# Patient Record
Sex: MALE | Race: WHITE | NOT HISPANIC OR LATINO | Employment: FULL TIME | URBAN - METROPOLITAN AREA
[De-identification: names, ages, dates, MRNs, and addresses within clinical notes are randomized per-mention and may not be internally consistent; named-entity substitution may affect disease eponyms.]

---

## 2024-10-31 ENCOUNTER — APPOINTMENT (OUTPATIENT)
Age: 54
End: 2024-10-31
Payer: COMMERCIAL

## 2024-10-31 ENCOUNTER — OFFICE VISIT (OUTPATIENT)
Age: 54
End: 2024-10-31
Payer: COMMERCIAL

## 2024-10-31 VITALS
SYSTOLIC BLOOD PRESSURE: 132 MMHG | DIASTOLIC BLOOD PRESSURE: 78 MMHG | BODY MASS INDEX: 33.64 KG/M2 | HEIGHT: 70 IN | HEART RATE: 82 BPM | WEIGHT: 235 LBS

## 2024-10-31 DIAGNOSIS — M54.16 RADICULOPATHY, LUMBAR REGION: Primary | ICD-10-CM

## 2024-10-31 DIAGNOSIS — M25.551 PAIN IN RIGHT HIP: ICD-10-CM

## 2024-10-31 PROCEDURE — 99204 OFFICE O/P NEW MOD 45 MIN: CPT | Performed by: ORTHOPAEDIC SURGERY

## 2024-10-31 PROCEDURE — 73502 X-RAY EXAM HIP UNI 2-3 VIEWS: CPT

## 2024-10-31 RX ORDER — LEVOTHYROXINE SODIUM 125 UG/1
1 TABLET ORAL
COMMUNITY
Start: 2024-06-21

## 2024-10-31 RX ORDER — PEN NEEDLE, DIABETIC, SAFETY 30 GX3/16"
NEEDLE, DISPOSABLE MISCELLANEOUS
COMMUNITY
Start: 2024-08-21

## 2024-10-31 RX ORDER — CYCLOBENZAPRINE HCL 5 MG
TABLET ORAL
COMMUNITY
Start: 2024-09-06

## 2024-10-31 RX ORDER — LISINOPRIL 20 MG/1
20 TABLET ORAL DAILY
COMMUNITY

## 2024-10-31 RX ORDER — INSULIN GLARGINE-YFGN 100 [IU]/ML
INJECTION, SOLUTION SUBCUTANEOUS
COMMUNITY

## 2024-10-31 RX ORDER — CHLORTHALIDONE 25 MG/1
1 TABLET ORAL EVERY MORNING
COMMUNITY
Start: 2024-06-21

## 2024-10-31 NOTE — PROGRESS NOTES
CHIEF COMPLAINT/REASON FOR VISIT  Chief Complaint   Patient presents with    Right Hip - Pain        HISTORY OF PRESENT ILLNESS  Miguel Kc is a 54 y.o. male who presents for evaluation of his right  hip . Patient said for the past 2.5 years he has been dealing lower back pain which radiates down into the foot. Patient said he started seeing a chiropractor which helped with the lower back pain but not with the pain that radiates down to the foot.     REVIEW OF SYSTEMS  Review of systems was performed and, outside that mentioned in the HPI, it was negative for symptomology related to the integumentary, hematologic, immunologic, allergic, neurologic, cardiovascular, respiratory, GI or  systems.    MEDICAL HISTORY  There is no problem list on file for this patient.      SURGICAL HISTORY  History reviewed. No pertinent surgical history.    CURRENT MEDICATIONS    Current Outpatient Medications:     BD AutoShield Duo 30G X 5 MM MISC, 1 STICK BY MISCELLANEOUS ROUTE NIGHTLY., Disp: , Rfl:     chlorthalidone 25 mg tablet, Take 1 tablet by mouth every morning, Disp: , Rfl:     cyclobenzaprine (FLEXERIL) 5 mg tablet, take 1 tablet by mouth nightly as needed, Disp: , Rfl:     levothyroxine 125 mcg tablet, Take 1 tablet by mouth daily before breakfast, Disp: , Rfl:     lisinopril (ZESTRIL) 20 mg tablet, Take 20 mg by mouth daily, Disp: , Rfl:     metFORMIN (GLUCOPHAGE) 500 mg tablet, Take 1 tablet by mouth 2 (two) times a day with meals, Disp: , Rfl:     omeprazole (PriLOSEC) 20 mg delayed release capsule, Take 1 capsule by mouth in the morning, Disp: , Rfl:     Semglee, yfgn, 100 UNIT/ML SOPN, INJECT 15 UNITS INTO THE SKIN NIGHTLY, Disp: , Rfl:     SOCIAL HISTORY  Social History     Socioeconomic History    Marital status: /Civil Union     Spouse name: Not on file    Number of children: Not on file    Years of education: Not on file    Highest education level: Not on file   Occupational History    Not on file  "  Tobacco Use    Smoking status: Former     Types: Cigarettes     Start date: 3/20/2019     Passive exposure: Past    Smokeless tobacco: Former   Vaping Use    Vaping status: Never Used   Substance and Sexual Activity    Alcohol use: Yes    Drug use: Never    Sexual activity: Not on file   Other Topics Concern    Not on file   Social History Narrative    Not on file     Social Determinants of Health     Financial Resource Strain: Not on file   Food Insecurity: Not on file   Transportation Needs: Not on file   Physical Activity: Not on file   Stress: Not on file   Social Connections: Unknown (6/18/2024)    Received from [facility]     How often do you feel lonely or isolated from those around you? (Adult - for ages 18 years and over): Not on file   Intimate Partner Violence: Not on file   Housing Stability: Not on file       Objective     VITAL SIGNS  /78   Pulse 82   Ht 5' 10\" (1.778 m)   Wt 107 kg (235 lb)   BMI 33.72 kg/m²        PHYSICAL EXAMINATION    right hip:   Skin- no discoloration, wounds or gross deformity   No limb length discrepancy  Limp positioned normally.  No dislocation/deformity  Gait: normal  Tenderness to palpation: None  ROM: Decreased internal rotation  Alda test: negative  Positive SLR  AT/GS intact  Full strength and sensory intact to light touch throughout extremity       RADIOGRAPHIC EXAMINATION/DIAGNOSTICS:  Right hip x-rays show no obvious acute osseous abnormalities    ASSESSMENT/PLAN:  Lumbar radiculopathy    Given patient history and exam findings, he is likely dealing with pain stemming from the lumbar spine.  Referral placed to spine and pain management for further workup  We did discuss that his x-ray does not appear to show any arthritis at this time and it is unlikely that his pain is coming from the hip  We will see the patient on an as-needed basis  Patient is understanding and agreeable to plan.  He is to call with any other questions or " concerns.    Scribe Attestation      I,:  Amor Edmondson PA-C am acting as a scribe while in the presence of the attending physician.:       I,:  Raimundo Weinstein MD personally performed the services described in this documentation    as scribed in my presence.:

## 2024-12-16 ENCOUNTER — CONSULT (OUTPATIENT)
Dept: PAIN MEDICINE | Facility: MEDICAL CENTER | Age: 54
End: 2024-12-16
Payer: COMMERCIAL

## 2024-12-16 VITALS
HEIGHT: 70 IN | SYSTOLIC BLOOD PRESSURE: 135 MMHG | BODY MASS INDEX: 35.07 KG/M2 | DIASTOLIC BLOOD PRESSURE: 88 MMHG | WEIGHT: 245 LBS | HEART RATE: 65 BPM

## 2024-12-16 DIAGNOSIS — M54.16 RIGHT LUMBAR RADICULITIS: ICD-10-CM

## 2024-12-16 PROCEDURE — 99244 OFF/OP CNSLTJ NEW/EST MOD 40: CPT | Performed by: PHYSICAL MEDICINE & REHABILITATION

## 2024-12-16 RX ORDER — ATORVASTATIN CALCIUM 20 MG/1
1 TABLET, FILM COATED ORAL
COMMUNITY
Start: 2024-10-16

## 2024-12-16 NOTE — PROGRESS NOTES
Assessment  1. Right lumbar radiculitis        Plan  At this time an MRI of the lumbar spine is warranted as the patient continues with significant pain despite adequate conservative care trials including at home physical therapy and more recently chiropractic care.  Chiropractic did provide some relief of his back pain but he continues with radicular symptoms in a right L5 distribution.  Once the MRI is available for review we will determine if further treatment is indicated such as epidural steroid injections.    My impressions and treatment recommendations were discussed in detail with the patient who verbalized understanding and had no further questions.  Discharge instructions were provided. I personally saw and examined the patient and I agree with the above discussed plan of care.    No orders of the defined types were placed in this encounter.    New Medications Ordered This Visit   Medications    atorvastatin (LIPITOR) 20 mg tablet     Sig: Take 1 tablet by mouth daily at bedtime       History of Present Illness    Miguel Kc is a 54 y.o. male seen in consultation at the request of Dr. Weinstein regarding chronic back and radiating right leg pain.  The patient's been experiencing symptoms for at least 2-1/2 years without any inciting event or trauma.  He is describing moderate to severe intensity pain rated as a 7-8/10 which is nearly constant without any typical pattern characterized as burning numbness sharp and throbbing.  He does notice some lower extremity weakness when the pain is bad and has had his leg give way but no falls.    Currently not using any assistive devices.    Aggravating factors include walking coughing and sneezing.  Alleviating factors include sitting.    Diagnostic studies include x-rays of the lumbar spine which do not reveal any significant concerning findings.    Patient did notice some moderate relief with chiropractic care.  He has been visiting with a chiropractor over the  past year and discontinued about a month ago.    No relief from exercise heat or ice application or TENS unit.    Social history negative for tobacco marijuana and alcohol use.    Currently not using any prescription pain medications.    I have personally reviewed and/or updated the patient's past medical history, past surgical history, family history, social history, current medications, allergies, and vital signs today.     Review of Systems   Constitutional:  Negative for fever and unexpected weight change.   HENT:  Negative for trouble swallowing.    Eyes:  Negative for visual disturbance.   Respiratory:  Negative for shortness of breath and wheezing.    Cardiovascular:  Negative for chest pain and palpitations.   Gastrointestinal:  Negative for constipation, diarrhea, nausea and vomiting.   Endocrine: Negative for cold intolerance, heat intolerance and polydipsia.   Genitourinary:  Negative for difficulty urinating and frequency.   Musculoskeletal:  Positive for back pain, gait problem and myalgias. Negative for arthralgias and joint swelling.   Skin:  Negative for rash.   Neurological:  Negative for dizziness, seizures, syncope, weakness and headaches.   Hematological:  Does not bruise/bleed easily.   Psychiatric/Behavioral:  Negative for dysphoric mood.    All other systems reviewed and are negative.      There is no problem list on file for this patient.      Past Medical History:   Diagnosis Date    Bipolar disorder (HCC) 2010    Depression 2010    Diabetes mellitus (HCC) 2018    GERD (gastroesophageal reflux disease) 2024    Hypertension 2013    Peripheral neuropathy        Past Surgical History:   Procedure Laterality Date    ORTHOPEDIC SURGERY  2011       Family History   Problem Relation Age of Onset    Arthritis Mother     Diabetes Father     Cancer Maternal Grandmother     Diabetes Paternal Grandmother     Stroke Paternal Grandmother     Depression Brother     Diabetes Brother     Seizures Daughter   "      Social History     Occupational History    Not on file   Tobacco Use    Smoking status: Former     Average packs/day: 1.5 packs/day for 25.0 years (37.5 ttl pk-yrs)     Types: Cigarettes     Start date: 7/14/1982     Passive exposure: Past    Smokeless tobacco: Former     Types: Chew     Quit date: 7/12/1982   Vaping Use    Vaping status: Never Used   Substance and Sexual Activity    Alcohol use: Yes     Alcohol/week: 2.0 standard drinks of alcohol     Types: 2 Cans of beer per week     Comment: Not that often    Drug use: Not Currently     Frequency: 2.0 times per week     Types: Marijuana     Comment: Haven't smoked since 1998    Sexual activity: Not Currently     Partners: Female     Birth control/protection: None     Comment: With wife only       Current Outpatient Medications on File Prior to Visit   Medication Sig    atorvastatin (LIPITOR) 20 mg tablet Take 1 tablet by mouth daily at bedtime    chlorthalidone 25 mg tablet Take 1 tablet by mouth every morning    cyclobenzaprine (FLEXERIL) 5 mg tablet take 1 tablet by mouth nightly as needed    levothyroxine 125 mcg tablet Take 1 tablet by mouth daily before breakfast    lisinopril (ZESTRIL) 20 mg tablet Take 20 mg by mouth daily    metFORMIN (GLUCOPHAGE) 500 mg tablet Take 1 tablet by mouth 2 (two) times a day with meals    omeprazole (PriLOSEC) 20 mg delayed release capsule Take 1 capsule by mouth in the morning    Edilberto, carminegn, 100 UNIT/ML SOPN INJECT 15 UNITS INTO THE SKIN NIGHTLY    BD AutoShield Duo 30G X 5 MM MISC 1 STICK BY MISCELLANEOUS ROUTE NIGHTLY. (Patient not taking: Reported on 12/16/2024)     No current facility-administered medications on file prior to visit.       Allergies   Allergen Reactions    Bee Venom Anaphylaxis    Latex Rash       Physical Exam    /88   Pulse 65   Ht 5' 10\" (1.778 m)   Wt 111 kg (245 lb)   BMI 35.15 kg/m²     Constitutional: normal, well developed, well nourished, alert, in no distress and non-toxic and " no overt pain behavior.  Eyes: anicteric  HEENT: grossly intact  Neck: supple, symmetric, trachea midline and no masses   Pulmonary:even and unlabored  Cardiovascular:No edema or pitting edema present  Skin:Normal without rashes or lesions and well hydrated  Psychiatric:Mood and affect appropriate  Neurologic:Cranial Nerves II-XII grossly intact, bilateral lower extremity muscle stretch reflexes are physiologic and symmetric at the knees and ankles, bilateral lower extremity strength is normal, negative straight leg raise from a seated position  Musculoskeletal:normal    Imaging    XR spine lumbar minimum 4 views non injury  Order: [id number]  Impression    Mild multilevel degenerative change, most significant at L2-3 and L3-4.    [facility]    Dictation By:   Nestor Macdonald MD   This report has been electronically signed by:   Nestor Macdonald MD   3/4/2024 11:53 AM  Narrative    CLINICAL HISTORY: Chronic left-sided low back pain with left-sided sciatica.    COMPARISON: None    TECHNIQUE: 5 views of the lumbar spine.    FINDINGS:  There are 5 nonrib-bearing lumbar type vertebra. The vertebral body heights and alignment are maintained without evidence of acute fracture or significant spondylolisthesis. The intervertebral disc space heights are maintained. There are minimal marginal osteophytes, most significant at L2-3 and L3-4. Sacroiliac joints are maintained. The bowel gas pattern is unobstructed.  Exam End: 03/04/24  9:17 AM    Specimen Collected: 03/04/24 11:52 AM Last Resulted: 03/04/24 11:53 AM   Received From: [facility]  Result Received: 10/31/24 10:20 AM

## 2025-01-02 ENCOUNTER — TELEPHONE (OUTPATIENT)
Age: 55
End: 2025-01-02

## 2025-01-02 NOTE — TELEPHONE ENCOUNTER
REENAI    Caller: venkat Rivera    Doctor: David    Reason for call: pt called to be sure that we received his OV notes from his chiropractor.  Notes are available for the dr to review under the media tab dated 12/31/24    Call back#: [phone]

## 2025-01-02 NOTE — TELEPHONE ENCOUNTER
S/w pt and advised we received his records which ar ein media and his MRI was authorized per approval noted on appt desk. Pt verbalized understanding.

## 2025-01-17 ENCOUNTER — HOSPITAL ENCOUNTER (OUTPATIENT)
Dept: MRI IMAGING | Facility: HOSPITAL | Age: 55
Discharge: HOME | End: 2025-01-17
Attending: PHYSICAL MEDICINE & REHABILITATION
Payer: COMMERCIAL

## 2025-01-17 DIAGNOSIS — M54.16 RIGHT LUMBAR RADICULITIS: ICD-10-CM

## 2025-01-17 PROCEDURE — 72148 MRI LUMBAR SPINE W/O DYE: CPT

## 2025-01-24 ENCOUNTER — RESULTS FOLLOW-UP (OUTPATIENT)
Dept: RADIOLOGY | Facility: MEDICAL CENTER | Age: 55
End: 2025-01-24

## 2025-01-24 NOTE — TELEPHONE ENCOUNTER
S/w pt, advised of above. Pt verbalized agreement, denied blood thinning medication, confirmed dm. Pt stated that he is due for A1c but will not likely get that done until Feb. Pt's last A1c was 6.3 on 7/30/24. Advised pt, the writer will make JE aware however, his next A1c may be necessary before his procedure can be scheduled. Pt verbalized understanding and appreciation. Will cb if questions / issues arise.

## 2025-01-24 NOTE — TELEPHONE ENCOUNTER
----- Message from Sergio Hernandez DO sent at 1/24/2025  2:28 PM EST -----  L4-5: Mild diffuse disc bulge. Mild spinal canal and subarticular narrowing. Mild bilateral neural foraminal stenosis. Bilateral facet arthropathy and fluid within the facet joints with edema in the articular pillars and masses extending to the bilateral L4 and L5 pedicles.    RECOMMEND RIGHT L4-5 TFESI

## 2025-01-27 NOTE — RESULT ENCOUNTER NOTE
Procedure scheduled 2/14/25    Reviewed instructions: , NPO 1 hour prior, loose-fitting/comfortable pants, if ill/fever/infx/abx to call and reschedule.  No immunizations or vaccinations 2 days prior/after steroid injections. Patient stated verbal understanding.

## 2025-02-14 ENCOUNTER — HOSPITAL ENCOUNTER (OUTPATIENT)
Dept: RADIOLOGY | Facility: MEDICAL CENTER | Age: 55
End: 2025-02-14
Payer: COMMERCIAL

## 2025-02-14 VITALS
TEMPERATURE: 98.4 F | SYSTOLIC BLOOD PRESSURE: 131 MMHG | RESPIRATION RATE: 20 BRPM | OXYGEN SATURATION: 97 % | DIASTOLIC BLOOD PRESSURE: 89 MMHG | HEART RATE: 73 BPM

## 2025-02-14 DIAGNOSIS — M54.16 LUMBAR RADICULOPATHY: ICD-10-CM

## 2025-02-14 PROCEDURE — 64483 NJX AA&/STRD TFRM EPI L/S 1: CPT | Performed by: PHYSICAL MEDICINE & REHABILITATION

## 2025-02-14 RX ORDER — PAPAVERINE HCL 150 MG
10 CAPSULE, EXTENDED RELEASE ORAL ONCE
Status: COMPLETED | OUTPATIENT
Start: 2025-02-14 | End: 2025-02-14

## 2025-02-14 RX ADMIN — IOHEXOL 2 ML: 300 INJECTION, SOLUTION INTRAVENOUS at 13:32

## 2025-02-14 RX ADMIN — DEXAMETHASONE SODIUM PHOSPHATE 10 MG: 10 INJECTION INTRAMUSCULAR; INTRAVENOUS at 13:32

## 2025-02-14 NOTE — H&P
History of Present Illness: The patient is a 54 y.o. male who presents with complaints of right back and leg pain    Past Medical History:   Diagnosis Date    Bipolar disorder (HCC) 2010    Depression 2010    Diabetes mellitus (HCC) 2018    GERD (gastroesophageal reflux disease) 2024    Hypertension 2013    Peripheral neuropathy        Past Surgical History:   Procedure Laterality Date    ORTHOPEDIC SURGERY  2011         Current Outpatient Medications:     atorvastatin (LIPITOR) 20 mg tablet, Take 1 tablet by mouth daily at bedtime, Disp: , Rfl:     BD AutoShield Duo 30G X 5 MM MISC, 1 STICK BY MISCELLANEOUS ROUTE NIGHTLY. (Patient not taking: Reported on 12/16/2024), Disp: , Rfl:     chlorthalidone 25 mg tablet, Take 1 tablet by mouth every morning, Disp: , Rfl:     cyclobenzaprine (FLEXERIL) 5 mg tablet, take 1 tablet by mouth nightly as needed, Disp: , Rfl:     levothyroxine 125 mcg tablet, Take 1 tablet by mouth daily before breakfast, Disp: , Rfl:     lisinopril (ZESTRIL) 20 mg tablet, Take 20 mg by mouth daily, Disp: , Rfl:     metFORMIN (GLUCOPHAGE) 500 mg tablet, Take 1 tablet by mouth 2 (two) times a day with meals, Disp: , Rfl:     omeprazole (PriLOSEC) 20 mg delayed release capsule, Take 1 capsule by mouth in the morning, Disp: , Rfl:     Semglee, yfgn, 100 UNIT/ML SOPN, INJECT 15 UNITS INTO THE SKIN NIGHTLY, Disp: , Rfl:     Allergies   Allergen Reactions    Bee Venom Anaphylaxis    Latex Rash       Physical Exam:   Vitals:    02/14/25 1313   BP: 132/88   Pulse: 69   Resp: 20   Temp: 98.4 °F (36.9 °C)   SpO2: 95%     General: Awake, Alert, Oriented x 3, Mood and affect appropriate  Respiratory: Respirations even and unlabored  Cardiovascular: Peripheral pulses intact; no edema  Musculoskeletal Exam: right back and leg pain    ASA Score: 3    Patient/Chart Verification  Patient ID Verified: Verbal  ID Band Applied: No  Consents Confirmed: To be obtained in the Procedural area  H&P( within 30 days)  Verified: To be obtained in the Procedural area  Allergies Reviewed: Yes (latex)  Anticoag/NSAID held?: NA  Currently on antibiotics?: No    Assessment:   1. Lumbar radiculopathy        Plan: RIGHT L4-5 TFESI ([id number])

## 2025-02-14 NOTE — DISCHARGE INSTR - LAB
Epidural Steroid Injection   WHAT YOU NEED TO KNOW:   An epidural steroid injection (CHRISTIAN) is a procedure to inject steroid medicine into the epidural space. The epidural space is between your spinal cord and vertebrae. Steroids reduce inflammation and fluid buildup in your spine that may be causing pain. You may be given pain medicine along with the steroids.          ACTIVITY  Do not drive or operate machinery today.  No strenuous activity today - bending, lifting, etc.  You may resume normal activites starting tomorrow - start slowly and as tolerated.  You may shower today, but no tub baths or hot tubs.  You may have numbness for several hours from the local anesthetic. Please use caution and common sense, especially with weight-bearing activities.    CARE OF THE INJECTION SITE  If you have soreness or pain, apply ice to the area today (20 minutes on/20 minutes off).  Starting tomorrow, you may use warm, moist heat or ice if needed.  You may have an increase or change in your discomfort for 36-48 hours after your treatment.  Apply ice and continue with any pain medication you have been prescribed.  Notify the [facility] if you have any of the following: redness, drainage, swelling, headache, stiff neck or fever above 100°F.    SPECIAL INSTRUCTIONS  Our office will contact you in approximately 14 days for a progress report.    MEDICATIONS  Continue to take all routine medications.  Our office may have instructed you to hold some medications.    As no general anesthesia was used in today's procedure, you should not experience any side effects related to anesthesia.     If you are diabetic, the steroids used in today's injection may temporarily increase your blood sugar levels after the first few days after your injection. Please keep a close eye on your sugars and alert the doctor who manages your diabetes if your sugars are significantly high from your baseline or you are symptomatic.     If you have a  problem specifically related to your procedure, please call our office at [phone].  Problems not related to your procedure should be directed to your primary care physician.

## 2025-02-28 ENCOUNTER — TELEPHONE (OUTPATIENT)
Dept: PAIN MEDICINE | Facility: CLINIC | Age: 55
End: 2025-02-28

## 2025-02-28 NOTE — TELEPHONE ENCOUNTER
Patient reports 50% improvement post inj  Pain level 6/10    Pain feels off and on, more off than on.

## 2025-03-18 ENCOUNTER — OFFICE VISIT (OUTPATIENT)
Dept: PAIN MEDICINE | Facility: MEDICAL CENTER | Age: 55
End: 2025-03-18
Payer: COMMERCIAL

## 2025-03-18 VITALS — HEIGHT: 70 IN | WEIGHT: 245 LBS | BODY MASS INDEX: 35.07 KG/M2

## 2025-03-18 DIAGNOSIS — M54.16 LUMBAR RADICULITIS: Primary | ICD-10-CM

## 2025-03-18 PROCEDURE — 99214 OFFICE O/P EST MOD 30 MIN: CPT

## 2025-03-18 RX ORDER — GABAPENTIN 300 MG/1
300 CAPSULE ORAL 3 TIMES DAILY
Qty: 90 CAPSULE | Refills: 1 | Status: SHIPPED | OUTPATIENT
Start: 2025-03-18

## 2025-03-18 NOTE — PROGRESS NOTES
Assessment:  1. Lumbar radiculitis        Plan:  Start gabapentin 300 mg as prescribed.  I discussed with the patient that I felt a medication such as gabapentin would be helpful in treating their pain. I discussed with the patient the type of medication it is, how it works, and that it requires a titration process that is specific to each individual. I reviewed with the patient that is may take 3-4 weeks for the medication's effects to be noticed and that is should never be abruptly stopped. Possible side effects include but are not limited to; vertigo, lethargy, nausea, and edema of the extremities. Advised the patient to call our office if they experience any side effects. The patient verbalized an understanding.    May consider LESI at next visit if symptoms are persisting/worsening.    Follow-up in approximately 4 weeks for medication review or sooner if needed.    My impressions and treatment recommendations were discussed in detail with the patient who verbalized understanding and had no further questions.  Discharge instructions were provided. I personally saw and examined the patient and I agree with the above discussed plan of care.    No orders of the defined types were placed in this encounter.    New Medications Ordered This Visit   Medications    INSULIN ASPART PENFILL SC    Atorvastatin Calcium 20 MG/5ML SUSP    gabapentin (Neurontin) 300 mg capsule     Sig: Take 1 capsule (300 mg total) by mouth 3 (three) times a day Take 1 capsule nightly x 3 days.  Then take 1 capsule twice daily x 3 days.  Then take 1 capsule 3 times daily.     Dispense:  90 capsule     Refill:  1       History of Present Illness:  Miguel Kc is a 54 y.o. male who presents for a follow up office visit in regards to low back pain and recent injection.  On 2/14/2024 patient had right L4-5 TFESI completed.  Patient noted 25 to 50% pain relief lasting approximately 2 weeks following this injection.      Patient continues to note  ongoing right sided low back pain with intermittent radiating pain down the right leg. Patient describes his pain as an intermittent burning, sharp, and cramping pain.  He rates his pain 7/10 on a numeric rating scale.  Admits intermittent numbness along the lateral right thigh, lateral right calf, and lateral right foot.  He notes his pain is exacerbated with walking and prolonged standing.  Admits to the use of ibuprofen for symptom management.    I have personally reviewed and/or updated the patient's past medical history, past surgical history, family history, social history, current medications, allergies, and vital signs today.     Review of Systems   Respiratory:  Negative for shortness of breath.    Cardiovascular:  Negative for chest pain.   Gastrointestinal:  Negative for constipation, diarrhea, nausea and vomiting.   Musculoskeletal:  Positive for back pain, gait problem and myalgias. Negative for arthralgias and joint swelling.   Skin:  Negative for rash.   Neurological:  Negative for dizziness, seizures and weakness.   All other systems reviewed and are negative.      Patient Active Problem List   Diagnosis    Lumbar radiculopathy       Past Medical History:   Diagnosis Date    Bipolar disorder ([facility]) 2010    Depression 2010    Diabetes mellitus ([facility]) 2018    GERD (gastroesophageal reflux disease) 2024    Hypertension 2013    Peripheral neuropathy        Past Surgical History:   Procedure Laterality Date    ORTHOPEDIC SURGERY  2011       Family History   Problem Relation Age of Onset    Arthritis Mother     Diabetes Father     Cancer Maternal Grandmother     Diabetes Paternal Grandmother     Stroke Paternal Grandmother     Depression Brother     Diabetes Brother     Seizures Daughter        Social History     Occupational History    Not on file   Tobacco Use    Smoking status: Former     Average packs/day: 1.5 packs/day for 25.0 years (37.5 ttl pk-yrs)     Types: Cigarettes     Start date: 7/14/1982      "Passive exposure: Past    Smokeless tobacco: Former     Types: Chew     Quit date: 7/12/1982   Vaping Use    Vaping status: Never Used   Substance and Sexual Activity    Alcohol use: Yes     Alcohol/week: 2.0 standard drinks of alcohol     Types: 2 Cans of beer per week     Comment: Not that often    Drug use: Not Currently     Frequency: 2.0 times per week     Types: Marijuana     Comment: Haven't smoked since 1998    Sexual activity: Not Currently     Partners: Female     Birth control/protection: None     Comment: With wife only       Current Outpatient Medications on File Prior to Visit   Medication Sig    atorvastatin (LIPITOR) 20 mg tablet Take 1 tablet by mouth daily at bedtime    Atorvastatin Calcium 20 MG/5ML SUSP     chlorthalidone 25 mg tablet Take 1 tablet by mouth every morning    INSULIN ASPART PENFILL SC     levothyroxine 125 mcg tablet Take 1 tablet by mouth daily before breakfast    lisinopril (ZESTRIL) 20 mg tablet Take 20 mg by mouth daily    metFORMIN (GLUCOPHAGE) 500 mg tablet Take 1 tablet by mouth 2 (two) times a day with meals    omeprazole (PriLOSEC) 20 mg delayed release capsule Take 1 capsule by mouth in the morning    Semglee, yfgn, 100 UNIT/ML SOPN INJECT 15 UNITS INTO THE SKIN NIGHTLY    BD AutoShield Duo 30G X 5 MM MISC 1 STICK BY MISCELLANEOUS ROUTE NIGHTLY. (Patient not taking: Reported on 12/16/2024)    cyclobenzaprine (FLEXERIL) 5 mg tablet take 1 tablet by mouth nightly as needed     No current facility-administered medications on file prior to visit.       Allergies   Allergen Reactions    Bee Venom Anaphylaxis    Latex Rash       Physical Exam:    Ht 5' 10\" (1.778 m)   Wt 111 kg (245 lb)   BMI 35.15 kg/m²     Constitutional:normal, well developed, well nourished, alert, in no distress and non-toxic and no overt pain behavior.  Eyes:anicteric  HEENT:grossly intact  Neck:supple, symmetric, trachea midline and no masses   Pulmonary:even and unlabored  Cardiovascular:No edema or " pitting edema present  Skin:Normal without rashes or lesions and well hydrated  Psychiatric:Mood and affect appropriate  Neurologic:Cranial Nerves II-XII grossly intact, negative straight leg raise bilaterally, bilateral lower extremity strength normal  Musculoskeletal:normal    Imaging

## 2025-04-18 ENCOUNTER — OFFICE VISIT (OUTPATIENT)
Dept: PAIN MEDICINE | Facility: MEDICAL CENTER | Age: 55
End: 2025-04-18
Payer: COMMERCIAL

## 2025-04-18 VITALS — WEIGHT: 244.2 LBS | BODY MASS INDEX: 34.96 KG/M2 | HEIGHT: 70 IN

## 2025-04-18 DIAGNOSIS — M54.16 LUMBAR RADICULITIS: Primary | ICD-10-CM

## 2025-04-18 PROCEDURE — 99213 OFFICE O/P EST LOW 20 MIN: CPT

## 2025-04-18 NOTE — PROGRESS NOTES
Assessment:  1. Lumbar radiculitis        Plan:  Continue gabapentin as prescribed.  Patient reports significant symptom relief after recently beginning gabapentin 300 mg 3 times daily.  Patient states he tolerates this medications well without side effects.  No refills of this medication were needed to be sent into patient's pharmacy at today's visit.    Unfortunately, patient previously had a right L4-5 TFESI which provided only 25-50% relief which lasted only 2 weeks.  May consider L5-S1 LESI at next visit if patient's symptoms persist/worsen.    Follow-up in 3 months, or sooner if needed.    My impressions and treatment recommendations were discussed in detail with the patient who verbalized understanding and had no further questions.  Discharge instructions were provided. I personally saw and examined the patient and I agree with the above discussed plan of care.    No orders of the defined types were placed in this encounter.    No orders of the defined types were placed in this encounter.      History of Present Illness:  Miguel Kc is a 55 y.o. male who presents for a follow up office visit in regards to his chronic low back pain.  At last visit, patient was started on gabapentin 300 mg 3 times daily.  Patient notes significant symptom improvement after beginning this medication.  He notes he tolerates this medication well without side effects.  He does note some occasional right-sided low back pain.  He describes this as a cramping and shooting pain.  He rates his pain 4/10 on a numeric rating scale.  Patient does note some intermittent radiating pain, numbness, and tingling along the lateral right thigh.    I have personally reviewed and/or updated the patient's past medical history, past surgical history, family history, social history, current medications, allergies, and vital signs today.     Review of Systems    Patient Active Problem List   Diagnosis    Lumbar radiculopathy       Past Medical  History:   Diagnosis Date    Bipolar disorder ([facility]) 2010    Depression 2010    Diabetes mellitus ([facility]) 2018    GERD (gastroesophageal reflux disease) 2024    Hypertension 2013    Peripheral neuropathy        Past Surgical History:   Procedure Laterality Date    ORTHOPEDIC SURGERY  2011       Family History   Problem Relation Age of Onset    Arthritis Mother     Diabetes Father     Cancer Maternal Grandmother     Diabetes Paternal Grandmother     Stroke Paternal Grandmother     Depression Brother     Diabetes Brother     Seizures Daughter        Social History     Occupational History    Not on file   Tobacco Use    Smoking status: Former     Average packs/day: 1.5 packs/day for 25.0 years (37.5 ttl pk-yrs)     Types: Cigarettes     Start date: 7/14/1982     Passive exposure: Past    Smokeless tobacco: Former     Types: Chew     Quit date: 7/12/1982   Vaping Use    Vaping status: Never Used   Substance and Sexual Activity    Alcohol use: Yes     Alcohol/week: 2.0 standard drinks of alcohol     Types: 2 Cans of beer per week     Comment: Not that often    Drug use: Not Currently     Frequency: 2.0 times per week     Types: Marijuana     Comment: Haven't smoked since 1998    Sexual activity: Not Currently     Partners: Female     Birth control/protection: None     Comment: With wife only       Current Outpatient Medications on File Prior to Visit   Medication Sig    atorvastatin (LIPITOR) 20 mg tablet Take 1 tablet by mouth daily at bedtime    Atorvastatin Calcium 20 MG/5ML SUSP     chlorthalidone 25 mg tablet Take 1 tablet by mouth every morning    gabapentin (Neurontin) 300 mg capsule Take 1 capsule (300 mg total) by mouth 3 (three) times a day Take 1 capsule nightly x 3 days.  Then take 1 capsule twice daily x 3 days.  Then take 1 capsule 3 times daily.    INSULIN ASPART PENFILL SC     levothyroxine 125 mcg tablet Take 1 tablet by mouth daily before breakfast    lisinopril (ZESTRIL) 20 mg tablet Take 20 mg by mouth  "daily    metFORMIN (GLUCOPHAGE) 500 mg tablet Take 1 tablet by mouth 2 (two) times a day with meals    omeprazole (PriLOSEC) 20 mg delayed release capsule Take 1 capsule by mouth in the morning    Semglee, yfgn, 100 UNIT/ML SOPN INJECT 15 UNITS INTO THE SKIN NIGHTLY    BD AutoShield Duo 30G X 5 MM MISC 1 STICK BY MISCELLANEOUS ROUTE NIGHTLY. (Patient not taking: Reported on 12/16/2024)    cyclobenzaprine (FLEXERIL) 5 mg tablet take 1 tablet by mouth nightly as needed     No current facility-administered medications on file prior to visit.       Allergies   Allergen Reactions    Bee Venom Anaphylaxis    Latex Rash       Physical Exam:    Ht 5' 10\" (1.778 m)   Wt 111 kg (244 lb 3.2 oz)   BMI 35.04 kg/m²     Constitutional:normal, well developed, well nourished, alert, in no distress and non-toxic and no overt pain behavior.  Eyes:anicteric  HEENT:grossly intact  Neck:supple, symmetric, trachea midline and no masses   Pulmonary:even and unlabored  Cardiovascular:No edema or pitting edema present  Skin:Normal without rashes or lesions and well hydrated  Psychiatric:Mood and affect appropriate  Neurologic:Cranial Nerves II-XII grossly intact  Musculoskeletal:normal    Imaging    "

## 2025-05-26 DIAGNOSIS — M54.16 LUMBAR RADICULITIS: ICD-10-CM

## 2025-05-28 NOTE — TELEPHONE ENCOUNTER
Patient called to request a refill for their gabapentin  advised a refill was requested on 5/26 and is pending approval. Patient verbalized understanding and is in agreement.     Does the patient have enough for 3 days?   [x] Yes   [] No - Send as HP to POD

## 2025-05-29 DIAGNOSIS — M54.16 LUMBAR RADICULITIS: Primary | ICD-10-CM

## 2025-05-29 RX ORDER — GABAPENTIN 300 MG/1
300 CAPSULE ORAL 3 TIMES DAILY
Qty: 90 CAPSULE | Refills: 2 | Status: SHIPPED | OUTPATIENT
Start: 2025-05-29

## 2025-05-29 RX ORDER — GABAPENTIN 300 MG/1
300 CAPSULE ORAL 3 TIMES DAILY
Qty: 90 CAPSULE | Refills: 1 | OUTPATIENT
Start: 2025-05-29

## 2025-05-29 NOTE — TELEPHONE ENCOUNTER
S/W pt.  Pt stated he needs a refill.  He is taking it 300 mg TID.  Pt denies side effects and it is helping. Pt stated C/B not needed once sent in.  Please advise.

## 2025-06-09 ENCOUNTER — OFFICE VISIT (OUTPATIENT)
Dept: PAIN MEDICINE | Facility: MEDICAL CENTER | Age: 55
End: 2025-06-09
Payer: COMMERCIAL

## 2025-06-09 VITALS — WEIGHT: 248 LBS | HEIGHT: 70 IN | BODY MASS INDEX: 35.5 KG/M2

## 2025-06-09 DIAGNOSIS — M54.16 LUMBAR RADICULITIS: Primary | ICD-10-CM

## 2025-06-09 PROCEDURE — 99214 OFFICE O/P EST MOD 30 MIN: CPT

## 2025-06-09 NOTE — PROGRESS NOTES
Name: Miguel Kc      : 1970      MRN: [id number]  Encounter Provider: Chela Sellers PA-C  Encounter Date: 2025   [facility]  :  Assessment & Plan  Lumbar radiculitis    Orders:    [place] spine and pain procedure; Future      Schedule for L5-S1 LESI.  Complete risks and benefits including hyperglycemia, bleeding, infection, local tissue reaction, nerve injury, and allergic reaction were discussed. The approach was demonstrated using models and literature was provided. The patient was agreeable, verbalized an understanding, and wishes to proceed with scheduling the procedure.    Continue the use of gabapentin 600 mg 3 times daily as prescribed.  Patient states he tolerates them at this medication well without side effects.  No refills of this medication were needed at today's visit.    May consider trial of baclofen and/or Celebrex at next visit if patient's pain symptoms persist/worsen.  Patient declined additional medication management at today's visit.    Follow-up after injection, or sooner if needed.    My impressions and treatment recommendations were discussed in detail with the patient who verbalized understanding and had no further questions.  Discharge instructions were provided. I personally saw and examined the patient and I agree with the above discussed plan of care.    History of Present Illness     Miguel Kc is a 55 y.o. male who presents for a follow up office visit in regards to Back Pain.  Patient notes his pain has been worsening since his last office visit on 2025.  Patient continues to locate his pain to the bilateral aspects of lower back.  He describes his pain as an intermittent ripping sensation.  He rates pain today 6/10 on the numeric rating scale.  Admits radiating pain to the posterior aspect of bilateral thighs and into the lateral aspect of the right lower leg, and a L5 distribution pattern.  Patient notes his pain is  "exacerbated with walking and positional changes such as sit to stand. Denies numbness, tingling, and weakness of bilateral lower extremities.  Admits to the use of gabapentin for symptom management.  Patient notes mild symptom improvement with the use of gabapentin.    Review of Systems   Respiratory:  Negative for shortness of breath.    Cardiovascular:  Negative for chest pain.   Gastrointestinal:  Negative for constipation, diarrhea, nausea and vomiting.   Musculoskeletal:  Positive for arthralgias, gait problem and myalgias. Negative for joint swelling.   Skin:  Negative for rash.   Neurological:  Negative for dizziness, seizures and weakness.   All other systems reviewed and are negative.      Medical History Reviewed by provider this encounter:     .  Medications Ordered Prior to Encounter[1]      Objective   Ht 5' 10\" (1.778 m)   Wt 112 kg (248 lb)   BMI 35.58 kg/m²      Pain Score:   6  Physical Exam  Constitutional: normal, well developed, well nourished, alert, in no distress and non-toxic and no overt pain behavior.  Eyes: anicteric  HEENT: grossly intact  Neck: supple, symmetric, trachea midline and no masses   Pulmonary: even and unlabored  Cardiovascular: No edema or pitting edema present  Skin: Normal without rashes or lesions and well hydrated  Psychiatric: Mood and affect appropriate  Neurologic: Cranial Nerves II-XII grossly intact, bilateral lower extremity strength is normal, negative seated straight leg raise bilaterally  Musculoskeletal: normal    Radiology Results Review: I have reviewed radiology reports from 1/17/2025 including: MRI spine.         [1]   Current Outpatient Medications on File Prior to Visit   Medication Sig Dispense Refill    atorvastatin (LIPITOR) 20 mg tablet Take 1 tablet by mouth daily at bedtime      Atorvastatin Calcium 20 MG/5ML SUSP       chlorthalidone 25 mg tablet Take 1 tablet by mouth every morning      gabapentin (NEURONTIN) 300 mg capsule Take 1 capsule (300 " mg total) by mouth 3 (three) times a day 90 capsule 2    INSULIN ASPART PENFILL SC       levothyroxine 125 mcg tablet Take 1 tablet by mouth daily before breakfast      lisinopril (ZESTRIL) 20 mg tablet Take 20 mg by mouth in the morning.      metFORMIN (GLUCOPHAGE) 500 mg tablet Take 1 tablet by mouth in the morning and 1 tablet in the evening. Take with meals.      omeprazole (PriLOSEC) 20 mg delayed release capsule Take 1 capsule by mouth in the morning      Semglee, yfgn, 100 UNIT/ML SOPN       BD AutoShield Duo 30G X 5 MM MISC 1 STICK BY MISCELLANEOUS ROUTE NIGHTLY. (Patient not taking: Reported on 6/9/2025)      [DISCONTINUED] cyclobenzaprine (FLEXERIL) 5 mg tablet take 1 tablet by mouth nightly as needed       No current facility-administered medications on file prior to visit.

## 2025-06-18 ENCOUNTER — HOSPITAL ENCOUNTER (OUTPATIENT)
Dept: RADIOLOGY | Facility: MEDICAL CENTER | Age: 55
Discharge: HOME | End: 2025-06-18
Payer: COMMERCIAL

## 2025-06-18 VITALS
SYSTOLIC BLOOD PRESSURE: 131 MMHG | DIASTOLIC BLOOD PRESSURE: 90 MMHG | RESPIRATION RATE: 18 BRPM | HEART RATE: 64 BPM | TEMPERATURE: 98.2 F | OXYGEN SATURATION: 95 %

## 2025-06-18 DIAGNOSIS — M54.16 LUMBAR RADICULITIS: ICD-10-CM

## 2025-06-18 PROCEDURE — 62323 NJX INTERLAMINAR LMBR/SAC: CPT | Performed by: PHYSICAL MEDICINE & REHABILITATION

## 2025-06-18 RX ORDER — METHYLPREDNISOLONE ACETATE 80 MG/ML
80 INJECTION, SUSPENSION INTRA-ARTICULAR; INTRALESIONAL; INTRAMUSCULAR; PARENTERAL; SOFT TISSUE ONCE
Status: COMPLETED | OUTPATIENT
Start: 2025-06-18 | End: 2025-06-18

## 2025-06-18 RX ADMIN — IOHEXOL 2 ML: 300 INJECTION, SOLUTION INTRAVENOUS at 08:28

## 2025-06-18 RX ADMIN — METHYLPREDNISOLONE ACETATE 80 MG: 80 INJECTION, SUSPENSION INTRA-ARTICULAR; INTRALESIONAL; INTRAMUSCULAR; SOFT TISSUE at 08:29

## 2025-06-18 NOTE — H&P
History of Present Illness: The patient is a 55 y.o. male who presents with complaints of back and leg pain    Past Medical History[1]    Past Surgical History[2]    Current Medications[3]    Allergies[4]    Physical Exam:   Vitals:    06/18/25 0811   BP: 129/86   Pulse: 62   Resp: 18   Temp: 98.2 °F (36.8 °C)   SpO2: 98%     General: Awake, Alert, Oriented x 3, Mood and affect appropriate  Respiratory: Respirations even and unlabored  Cardiovascular: Peripheral pulses intact; no edema  Musculoskeletal Exam: back and leg pain    ASA Score: 3    Patient/Chart Verification  Patient ID Verified: Verbal  ID Band Applied: No  Consents Confirmed: To be obtained in the Procedural area  Interval H&P(within 24 hr) Complete (required for Outpatients and Surgery Admit only): To be obtained in the Procedural area  Allergies Reviewed: Yes  Anticoag/NSAID held?: NA  Currently on antibiotics?: No    Assessment:   1. Lumbar radiculitis        Plan: L5-S1 LESI         [1]   Past Medical History:  Diagnosis Date    Bipolar disorder ([facility]) 2010    Depression 2010    Diabetes mellitus ([facility]) 2018    GERD (gastroesophageal reflux disease) 2024    Hypertension 2013    Peripheral neuropathy    [2]   Past Surgical History:  Procedure Laterality Date    ORTHOPEDIC SURGERY  2011   [3]   Current Outpatient Medications:     atorvastatin (LIPITOR) 20 mg tablet, Take 1 tablet by mouth daily at bedtime, Disp: , Rfl:     Atorvastatin Calcium 20 MG/5ML SUSP, , Disp: , Rfl:     BD AutoShield Duo 30G X 5 MM MISC, 1 STICK BY MISCELLANEOUS ROUTE NIGHTLY. (Patient not taking: Reported on 6/9/2025), Disp: , Rfl:     chlorthalidone 25 mg tablet, Take 1 tablet by mouth every morning, Disp: , Rfl:     gabapentin (NEURONTIN) 300 mg capsule, Take 1 capsule (300 mg total) by mouth 3 (three) times a day, Disp: 90 capsule, Rfl: 2    INSULIN ASPART PENFILL SC, , Disp: , Rfl:     levothyroxine 125 mcg tablet, Take 1 tablet by mouth daily before breakfast, Disp: ,  Rfl:     lisinopril (ZESTRIL) 20 mg tablet, Take 20 mg by mouth in the morning., Disp: , Rfl:     metFORMIN (GLUCOPHAGE) 500 mg tablet, Take 1 tablet by mouth in the morning and 1 tablet in the evening. Take with meals., Disp: , Rfl:     omeprazole (PriLOSEC) 20 mg delayed release capsule, Take 1 capsule by mouth in the morning, Disp: , Rfl:     Semglee, yfgn, 100 UNIT/ML SOPN, , Disp: , Rfl:   [4]   Allergies  Allergen Reactions    Bee Venom Anaphylaxis    Latex Rash

## 2025-06-18 NOTE — DISCHARGE INSTR - LAB
Epidural Steroid Injection   WHAT YOU NEED TO KNOW:   An epidural steroid injection (CHRISTIAN) is a procedure to inject steroid medicine into the epidural space. The epidural space is between your spinal cord and vertebrae. Steroids reduce inflammation and fluid buildup in your spine that may be causing pain. You may be given pain medicine along with the steroids.          ACTIVITY  Do not drive or operate machinery today.  No strenuous activity today - bending, lifting, etc.  You may resume normal activites starting tomorrow - start slowly and as tolerated.  You may shower today, but no tub baths or hot tubs.  You may have numbness for several hours from the local anesthetic. Please use caution and common sense, especially with weight-bearing activities.    CARE OF THE INJECTION SITE  If you have soreness or pain, apply ice to the area today (20 minutes on/20 minutes off).  Starting tomorrow, you may use warm, moist heat or ice if needed.  You may have an increase or change in your discomfort for 36-48 hours after your treatment.  Apply ice and continue with any pain medication you have been prescribed.  Notify the [facility] if you have any of the following: redness, drainage, swelling, headache, stiff neck or fever above 100°F.    SPECIAL INSTRUCTIONS  Our office will contact you in approximately 14 days for a progress report.    MEDICATIONS  Continue to take all routine medications.  Our office may have instructed you to hold some medications.    As no general anesthesia was used in today's procedure, you should not experience any side effects related to anesthesia.     If you are diabetic, the steroids used in today's injection may temporarily increase your blood sugar levels after the first few days after your injection. Please keep a close eye on your sugars and alert the doctor who manages your diabetes if your sugars are significantly high from your baseline or you are symptomatic.     If you have a  problem specifically related to your procedure, please call our office at [phone].  Problems not related to your procedure should be directed to your primary care physician.

## 2025-06-23 ENCOUNTER — TELEPHONE (OUTPATIENT)
Dept: PAIN MEDICINE | Facility: MEDICAL CENTER | Age: 55
End: 2025-06-23

## 2025-06-23 DIAGNOSIS — M54.16 LUMBAR RADICULITIS: Primary | ICD-10-CM

## 2025-06-23 RX ORDER — GABAPENTIN 600 MG/1
600 TABLET ORAL 3 TIMES DAILY
Qty: 90 TABLET | Refills: 2 | Status: SHIPPED | OUTPATIENT
Start: 2025-06-23 | End: 2025-06-27

## 2025-06-23 NOTE — TELEPHONE ENCOUNTER
Patient called the RX Refill Line. Message is being forwarded to the office.     Patient is requesting a message be sent to the office regarding the following medication: gabapentin (NEURONTIN) 300 mg capsule    Patient states a new script is needed with updated instructions has he now his medication was increased to six times a day.      gabapentin (NEURONTIN) 300 mg capsule   Take 1 capsule (300 mg total) by mouth 3 (three) times a day   [facility] [id number] [place] [facility] [place] [place]  [address]  [address] [place] [address]  Phone: [phone]  Fax: [phone]   Dispense Quantity: 336 capsule (30 day supply)    Refills: 2      Please contact patient at [phone] as per patient request once completed.

## 2025-06-23 NOTE — TELEPHONE ENCOUNTER
RN s/w pt regarding previous.  Pt is taking 600 mg TID.    Per pt the refills that he has are for the 300mg TID and it would be too early to fill.    Pt ok if [facility] sends new script for 600mg tablets one tablet TID

## 2025-06-26 NOTE — TELEPHONE ENCOUNTER
Caller: Miguel    Doctor: Dr. Chela MARTINEZ    Reason for call: pt is asking if he can be weaned off gabapentin due to muscle cramps he didn't have prior. He stated it stated when there was increase of the dose. Please advise pt     Call back#: [phone]

## 2025-06-27 DIAGNOSIS — M54.16 LUMBAR RADICULITIS: Primary | ICD-10-CM

## 2025-06-27 RX ORDER — GABAPENTIN 300 MG/1
300 CAPSULE ORAL 3 TIMES DAILY
Qty: 90 CAPSULE | Refills: 2 | Status: SHIPPED | OUTPATIENT
Start: 2025-06-27

## 2025-07-02 ENCOUNTER — TELEPHONE (OUTPATIENT)
Dept: PAIN MEDICINE | Facility: CLINIC | Age: 55
End: 2025-07-02

## 2025-07-22 ENCOUNTER — OFFICE VISIT (OUTPATIENT)
Dept: PAIN MEDICINE | Facility: MEDICAL CENTER | Age: 55
End: 2025-07-22
Payer: COMMERCIAL

## 2025-07-22 VITALS — BODY MASS INDEX: 35.07 KG/M2 | HEIGHT: 70 IN | WEIGHT: 245 LBS

## 2025-07-22 DIAGNOSIS — M54.16 LUMBAR RADICULITIS: Primary | ICD-10-CM

## 2025-07-22 PROCEDURE — 99214 OFFICE O/P EST MOD 30 MIN: CPT

## 2025-07-22 RX ORDER — FLUOCINOLONE ACETONIDE 0.11 MG/ML
OIL AURICULAR (OTIC)
COMMUNITY
Start: 2025-06-09

## 2025-07-22 NOTE — PATIENT INSTRUCTIONS
Gabapentin Taper:  Take 1 tablet in the morning and 1 tablet at night x 3- 5 days.  Then take 1 tablet at bedtime x 3-5 days.  Then take 1 tablet every other night at bedtime x 1 week.

## 2025-07-22 NOTE — PROGRESS NOTES
Name: Miguel Kc      : 1970      MRN: [id number]  Encounter Provider: Chela Sellers PA-C  Encounter Date: 2025   Encounter department: [facility]  :  Assessment & Plan  Lumbar radiculitis         Can consider repeating L5-S1 LESI on or after the 3-month ramon if patient continues to report significant symptom relief and/or improvement in functionality/performing ADLs following most recent procedure done on 2025.    Patient requesting discontinuation of gabapentin due to experiencing side effects of muscle cramping.  I discussed with patient proper taper schedule for this medication.  Patient agreeable.      Follow-up in 3 months, or sooner if needed.    My impressions and treatment recommendations were discussed in detail with the patient who verbalized understanding and had no further questions.  Discharge instructions were provided. I personally saw and examined the patient and I agree with the above discussed plan of care.    History of Present Illness     Miguel Kc is a 55 y.o. male who presents to [facility] for interval re-evaluation in regards to pain in the Low back. He underwent left L5-S1 LESI on 2025.  Patient reports experiencing approximately 75-80% symptom relief following this recent injection.  Patient reports resolution of bilateral lower extremity radiating pain following recent injection.    Patient presents today with pain in lower back that does not radiate. Pain is described as Intermittent and Sharp. Symptoms are worse with prolonged sitting and standing.  Alleviating factors identifiable by the patient include walking. On the numeric pain scale of 1-10, the pain typically increases to max of 3 out of 10, which is currently impacting their quality of life and interferes with their activities of daily living.  Denies numbness, tingling, and weakness of bilateral lower extremities.  Admits to the use of gabapentin  "300 mg 3 times daily for symptom management.  Patient states he has been experiencing the side effect of muscle cramping with the use of gabapentin.  Patient states this muscle cramping has slightly improved with decreased dosage of gabapentin.  Patient is requesting discontinuation of gabapentin at this time.    Review of Systems   Constitutional:  Negative for fever.   HENT:  Negative for hearing loss.    Eyes:  Negative for visual disturbance.   Respiratory:  Negative for cough.    Cardiovascular:  Negative for leg swelling.   Gastrointestinal:  Negative for abdominal pain and nausea.   Endocrine: Negative for polydipsia.   Genitourinary:  Negative for difficulty urinating.   Musculoskeletal:  Positive for back pain. Negative for gait problem and myalgias.   Skin:  Negative for rash.   Neurological:  Negative for numbness.   Hematological:  Does not bruise/bleed easily.   Psychiatric/Behavioral:  Negative for dysphoric mood and sleep disturbance.        Medical History Reviewed by provider this encounter:     .  Medications Ordered Prior to Encounter[1]      Objective   Ht 5' 10\" (1.778 m)   Wt 111 kg (245 lb)   BMI 35.15 kg/m²      Pain Score:   3  Physical Exam  Constitutional: normal, well developed, well nourished, alert, in no distress and non-toxic and no overt pain behavior. and    Eyes: anicteric  HEENT: grossly intact  Neck: supple, symmetric, trachea midline and no masses   Pulmonary: even and unlabored  Cardiovascular: No edema or pitting edema present  Skin: Normal without rashes or lesions and well hydrated  Psychiatric: Mood and affect appropriate  Neurologic: Cranial Nerves II-XII grossly intact  Musculoskeletal: normal    Radiology Results Review : No pertinent imaging studies reviewed.         [1]   Current Outpatient Medications on File Prior to Visit   Medication Sig Dispense Refill    atorvastatin (LIPITOR) 20 mg tablet Take 1 tablet by mouth daily at bedtime      Atorvastatin Calcium 20 " MG/5ML SUSP       chlorthalidone 25 mg tablet Take 1 tablet by mouth every morning      fluocinolone acetonide (DERMOTIC) 0.01 % otic oil PLEASE SEE ATTACHED FOR DETAILED DIRECTIONS      gabapentin (NEURONTIN) 300 mg capsule Take 1 capsule (300 mg total) by mouth 3 (three) times a day 90 capsule 2    INSULIN ASPART PENFILL SC       levothyroxine 125 mcg tablet Take 1 tablet by mouth daily before breakfast      lisinopril (ZESTRIL) 20 mg tablet Take 20 mg by mouth in the morning.      metFORMIN (GLUCOPHAGE) 500 mg tablet Take 1 tablet by mouth in the morning and 1 tablet in the evening. Take with meals.      omeprazole (PriLOSEC) 20 mg delayed release capsule Take 1 capsule by mouth in the morning      Semglee, yfgn, 100 UNIT/ML SOPN       BD AutoShield Duo 30G X 5 MM MISC 1 STICK BY MISCELLANEOUS ROUTE NIGHTLY. (Patient not taking: Reported on 7/22/2025)       No current facility-administered medications on file prior to visit.